# Patient Record
Sex: FEMALE | Race: AMERICAN INDIAN OR ALASKA NATIVE
[De-identification: names, ages, dates, MRNs, and addresses within clinical notes are randomized per-mention and may not be internally consistent; named-entity substitution may affect disease eponyms.]

---

## 2019-06-07 ENCOUNTER — HOSPITAL ENCOUNTER (EMERGENCY)
Dept: HOSPITAL 5 - ED | Age: 29
Discharge: HOME | End: 2019-06-07
Payer: MEDICAID

## 2019-06-07 VITALS — SYSTOLIC BLOOD PRESSURE: 122 MMHG | DIASTOLIC BLOOD PRESSURE: 70 MMHG

## 2019-06-07 DIAGNOSIS — G43.909: Primary | ICD-10-CM

## 2019-06-07 PROCEDURE — 96372 THER/PROPH/DIAG INJ SC/IM: CPT

## 2019-06-07 PROCEDURE — 99282 EMERGENCY DEPT VISIT SF MDM: CPT

## 2019-06-07 NOTE — EVENT NOTE
ED Screening Note


ED Screening Note: 





pt is c/o HA for three days 


has a hx of migraines, states she use to take propranol and amtriptyline states 

she stopped taking it a year ago because she went to snf 


+phophobia


+nausea


LNMP: 6/6/19











This initial assessment/diagnostic orders/clinical plan/treatment(s) is/are 

subject to change based on patients health status, clinical progression and re-

assessment by fellow clinical providers in the ED. Further treatment and workup 

at subsequent clinical providers discretion. Patient/guardian urged not to elope

from the ED as their condition may be serious if not clinically assessed and 

managed.

## 2019-06-07 NOTE — EMERGENCY DEPARTMENT REPORT
ED Headache HPI





- General


Chief Complaint: Headache


Stated Complaint: CHEST PAIN/MIGRAINE


Time Seen by Provider: 19 12:05


Source: patient





- History of Present Illness


Timing/Duration: 1 week


Quality: moderate


Head Injury Location: global


Recent Head Trauma: frequent headaches


Modifying Factors: improves with: other (she has a history of taking several 

medications which usually does make her sleepy.  Patient is been off of these 

medications for some time secondary to not having a primary care physician.)


Associated Symptoms: other (light sensitivity).  denies: confusion, fatigue, 

facial pain, fever/chills, loss of consciousness, nausea/vomiting, nasal 

congestion, nasal drainage, numbness in legs/feet, seizures, sinus infection, 

stiff neck


Allergies/Adverse Reactions: 


Allergies





No Known Allergies Allergy (Unverified 10/03/17 12:03)


   








Home Medications: 


Ambulatory Orders





Butalb/Acetamin/Caff -40 [Fioricet -40] 1 tab PO Q8HR PRN #10 tablet

19 


Ketorolac [Toradol] 10 mg PO Q6H PRN #12 tablet 19 











ED Review of Systems


ROS: 


Stated complaint: CHEST PAIN/MIGRAINE


Other details as noted in HPI





Comment: All other systems reviewed and negative





ED Past Medical Hx





- Past Medical History


Previous Medical History?: Yes


Hx Headaches / Migraines: Yes





- Surgical History


Past Surgical History?: Yes


Additional Surgical History: ,tubal





- Social History


Smoking Status: Never Smoker


Substance Use Type: None





- Medications


Home Medications: 


                                Home Medications











 Medication  Instructions  Recorded  Confirmed  Last Taken  Type


 


Butalb/Acetamin/Caff -40 1 tab PO Q8HR PRN #10 tablet 19  Unknown Rx





[Fioricet -40]     


 


Ketorolac [Toradol] 10 mg PO Q6H PRN #12 tablet 19  Unknown Rx














ED Physical Exam





- General


Limitations: No Limitations


General appearance: alert, in no apparent distress





- Head


Head exam: Present: atraumatic, normocephalic





- Eye


Eye exam: Present: normal appearance, PERRL, EOMI





- ENT


ENT exam: Present: mucous membranes moist





- Neck


Neck exam: Present: normal inspection





- Respiratory


Respiratory exam: Present: normal lung sounds bilaterally.  Absent: respiratory 

distress





- Cardiovascular


Cardiovascular Exam: Present: regular rate, normal rhythm.  Absent: systolic 

murmur, diastolic murmur, rubs, gallop





- GI/Abdominal


GI/Abdominal exam: Present: soft, normal bowel sounds





- Extremities Exam


Extremities exam: Present: normal inspection





- Back Exam


Back exam: Present: normal inspection





- Neurological Exam


Neurological exam: Present: alert, oriented X3





- Psychiatric


Psychiatric exam: Present: normal affect, normal mood





- Skin


Skin exam: Present: warm, dry, intact, normal color.  Absent: rash





ED Course





                                   Vital Signs











  19





  12:05


 


Temperature 97.9 F


 


Pulse Rate 71


 


Respiratory 16





Rate 


 


Blood Pressure 140/85





[Left] 


 


O2 Sat by Pulse 100





Oximetry 














ED Medical Decision Making





- Medical Decision Making





Patient given a shot of Toradol for pain control the patient be discharged home.


Critical care attestation.: 


If time is entered above; I have spent that time in minutes in the direct care 

of this critically ill patient, excluding procedure time.








ED Disposition


Clinical Impression: 


Migraine


Qualifiers:


 Migraine type: unspecified Status migrainosus presence: without status 

migrainosus Intractability: not intractable Qualified Code(s): G43.909 - 

Migraine, unspecified, not intractable, without status migrainosus





Disposition: DC-01 TO HOME OR SELFCARE


Is pt being admited?: No


Does the pt Need Aspirin: No


Condition: Stable


Instructions:  Migraine Headache (ED)


Referrals: 


[facility], MD [Primary Care Provider] - 3-5 Days


Time of Disposition: 13:53

## 2019-08-06 ENCOUNTER — HOSPITAL ENCOUNTER (EMERGENCY)
Dept: HOSPITAL 5 - ED | Age: 29
Discharge: HOME | End: 2019-08-06
Payer: MEDICAID

## 2019-08-06 VITALS — DIASTOLIC BLOOD PRESSURE: 52 MMHG | SYSTOLIC BLOOD PRESSURE: 117 MMHG

## 2019-08-06 DIAGNOSIS — G43.909: Primary | ICD-10-CM

## 2019-08-06 DIAGNOSIS — Z79.899: ICD-10-CM

## 2019-08-06 PROCEDURE — 96375 TX/PRO/DX INJ NEW DRUG ADDON: CPT

## 2019-08-06 PROCEDURE — 96374 THER/PROPH/DIAG INJ IV PUSH: CPT

## 2019-08-06 PROCEDURE — 99282 EMERGENCY DEPT VISIT SF MDM: CPT

## 2019-08-06 NOTE — EVENT NOTE
ED Screening Note


Date of service: 08/06/19


Time: 16:20


ED Screening Note: 


28 y/o female comes in for headache times 1.5 weeks and 3 days of hand and feet 

swelling.  History of migraines








This initial assessment/diagnostic orders/clinical plan/treatment(s) is/are 

subject to change based on patients health status, clinical progression and re-

assessment by fellow clinical providers in the ED. Further treatment and workup 

at subsequent clinical providers discretion. Patient/guardian urged not to elope

from the ED as their condition may be serious if not clinically assessed and 

managed. 





Initial orders include:

## 2019-08-06 NOTE — EMERGENCY DEPARTMENT REPORT
HPI





- General


Chief Complaint: Headache


Time Seen by Provider: 19 16:09





ED Past Medical Hx





- Past Medical History


Hx Headaches / Migraines: Yes





- Surgical History


Past Surgical History?: Yes


Additional Surgical History: ,tubal





- Family History


Family history: no significant





- Social History


Smoking Status: Never Smoker


Substance Use Type: None





- Medications


Home Medications: 


                                Home Medications











 Medication  Instructions  Recorded  Confirmed  Last Taken  Type


 


Butalb/Acetamin/Caff -40 1 tab PO Q8HR PRN #10 tablet 19  Unknown Rx





[Fioricet -40]     


 


Ketorolac [Toradol] 10 mg PO Q6H PRN #12 tablet 19  Unknown Rx














ED Review of Systems


ROS: 


Stated complaint: BI HAND/FEET SWELLING/HEADACHE


Other details as noted in HPI





Comment: All other systems reviewed and negative





Physical Exam





- Physical Exam


Vital Signs: 


                                   Vital Signs











  19





  15:54 17:01


 


Temperature 98.3 F 


 


Pulse Rate 83 


 


Respiratory 18 16





Rate  


 


Blood Pressure 117/52 


 


O2 Sat by Pulse 99 





Oximetry  











Physical Exam: 





ALERT AND ORIENTED


NO FOCAL DEFICIT


AMBULATORY


NO VOMITING


SEES HER NEURO MD IN ONE WEEK


S1S2


LUNGS CTA


ABD SNT








ED Course


                                   Vital Signs











  19





  15:54 17:01


 


Temperature 98.3 F 


 


Pulse Rate 83 


 


Respiratory 18 16





Rate  


 


Blood Pressure 117/52 


 


O2 Sat by Pulse 99 





Oximetry  














ED Medical Decision Making





- Medical Decision Making





PT HAS HX OF MIGRAIENE


THIS IS HER USUAL HEADACHE SHE JUST CAN NOT BREAK IT WITH HER MEDS


NO HEAD TRAUMA


POS NAUSEA





NEURO INTACT


VSS





HAS APPNT NEXT WED WITH HER MD





NS/TORADOL/ZOFRAN/FIORICET IN ER





DC HOME WITH NEURO FOLLOW UP





                                   Vital Signs











  19





  15:54 17:01


 


Temperature 98.3 F 


 


Pulse Rate 83 


 


Respiratory 18 16





Rate  


 


Blood Pressure 117/52 


 


O2 Sat by Pulse 99 





Oximetry  














- Differential Diagnosis


A/C MIGRAINE


Critical care attestation.: 


If time is entered above; I have spent that time in minutes in the direct care 

of this critically ill patient, excluding procedure time.








ED Disposition


Clinical Impression: 


 Migraine





Disposition: DC-01 TO HOME OR SELFCARE


Is pt being admited?: No


Does the pt Need Aspirin: No


Condition: Stable


Instructions:  Migraine Headache (ED)


Additional Instructions: 


HYDRATE WELL





MEDS PER YOUR ROUTINE





FOLLOW UP WITH YOUR MD ASAP








Referrals: 


CAROLINA WASSERMAN MD [Referring] - 3-5 Days


Time of Disposition: 17:17

## 2021-09-02 ENCOUNTER — HOSPITAL ENCOUNTER (OUTPATIENT)
Dept: HOSPITAL 5 - ED | Age: 31
Setting detail: OBSERVATION
LOS: 1 days | Discharge: HOME | End: 2021-09-03
Attending: INTERNAL MEDICINE | Admitting: INTERNAL MEDICINE
Payer: MEDICAID

## 2021-09-02 DIAGNOSIS — Z90.710: ICD-10-CM

## 2021-09-02 DIAGNOSIS — K59.00: ICD-10-CM

## 2021-09-02 DIAGNOSIS — Z98.51: ICD-10-CM

## 2021-09-02 DIAGNOSIS — R06.00: ICD-10-CM

## 2021-09-02 DIAGNOSIS — R42: ICD-10-CM

## 2021-09-02 DIAGNOSIS — G43.909: ICD-10-CM

## 2021-09-02 DIAGNOSIS — D50.9: Primary | ICD-10-CM

## 2021-09-02 LAB
BASOPHILS # (AUTO): 0 K/MM3 (ref 0–0.1)
BASOPHILS NFR BLD AUTO: 0.3 % (ref 0–1.8)
BUN SERPL-MCNC: 7 MG/DL (ref 7–17)
BUN/CREAT SERPL: 12 %
CALCIUM SERPL-MCNC: 9.3 MG/DL (ref 8.4–10.2)
EOSINOPHIL # BLD AUTO: 0.1 K/MM3 (ref 0–0.4)
EOSINOPHIL NFR BLD AUTO: 1.1 % (ref 0–4.3)
HCT VFR BLD CALC: 21.1 % (ref 30.3–42.9)
HEMOLYSIS INDEX: 1
HGB BLD-MCNC: 5.9 GM/DL (ref 10.1–14.3)
LYMPHOCYTES # BLD AUTO: 2.2 K/MM3 (ref 1.2–5.4)
LYMPHOCYTES NFR BLD AUTO: 44.8 % (ref 13.4–35)
MCHC RBC AUTO-ENTMCNC: 28 % (ref 30–34)
MCV RBC AUTO: 64 FL (ref 79–97)
MONOCYTES # (AUTO): 0.3 K/MM3 (ref 0–0.8)
MONOCYTES % (AUTO): 5.7 % (ref 0–7.3)
PLATELET # BLD: 229 K/MM3 (ref 140–440)
RBC # BLD AUTO: 3.32 M/MM3 (ref 3.65–5.03)

## 2021-09-02 PROCEDURE — 86900 BLOOD TYPING SEROLOGIC ABO: CPT

## 2021-09-02 PROCEDURE — 85018 HEMOGLOBIN: CPT

## 2021-09-02 PROCEDURE — P9016 RBC LEUKOCYTES REDUCED: HCPCS

## 2021-09-02 PROCEDURE — 85610 PROTHROMBIN TIME: CPT

## 2021-09-02 PROCEDURE — 36430 TRANSFUSION BLD/BLD COMPNT: CPT

## 2021-09-02 PROCEDURE — 86920 COMPATIBILITY TEST SPIN: CPT

## 2021-09-02 PROCEDURE — 86901 BLOOD TYPING SEROLOGIC RH(D): CPT

## 2021-09-02 PROCEDURE — 84703 CHORIONIC GONADOTROPIN ASSAY: CPT

## 2021-09-02 PROCEDURE — 80048 BASIC METABOLIC PNL TOTAL CA: CPT

## 2021-09-02 PROCEDURE — C9113 INJ PANTOPRAZOLE SODIUM, VIA: HCPCS

## 2021-09-02 PROCEDURE — 36415 COLL VENOUS BLD VENIPUNCTURE: CPT

## 2021-09-02 PROCEDURE — 96374 THER/PROPH/DIAG INJ IV PUSH: CPT

## 2021-09-02 PROCEDURE — 85014 HEMATOCRIT: CPT

## 2021-09-02 PROCEDURE — 85025 COMPLETE CBC W/AUTO DIFF WBC: CPT

## 2021-09-02 PROCEDURE — 85379 FIBRIN DEGRADATION QUANT: CPT

## 2021-09-02 PROCEDURE — 86850 RBC ANTIBODY SCREEN: CPT

## 2021-09-02 PROCEDURE — 85007 BL SMEAR W/DIFF WBC COUNT: CPT

## 2021-09-02 PROCEDURE — G0378 HOSPITAL OBSERVATION PER HR: HCPCS

## 2021-09-02 PROCEDURE — 99291 CRITICAL CARE FIRST HOUR: CPT

## 2021-09-02 PROCEDURE — 96361 HYDRATE IV INFUSION ADD-ON: CPT

## 2021-09-02 NOTE — EMERGENCY DEPARTMENT REPORT
ED Recheck HPI





- General


Chief Complaint: Recheck/Abnormal Lab/Rx


Stated Complaint: LOW HEMOGLOBIN/WEAKNESS/HEADACHE/CP/


Time Seen by Provider: 21 23:01


Source: patient


Mode of arrival: Ambulatory


Limitations: No Limitations





- History of Present Illness


Initial Comments: 





Patient is a 31-year-old female who presents emergency room with complaints of 

lightheadedness, weakness and dyspnea on exertion.  Patient states her symptoms 

been well for 2 weeks.  Patient states her symptoms are worsening.  Patient 

denies chest pain.  Patient states that because of the symptoms she went to her 

primary care yesterday and had some blood drawn.  Patient states that her 

primary care called her this evening told to come to the hospital because her 

H&H was low.  Patient states hemoglobin was 5.7.  Patient states she had a 

transfusion in the past.  Patient denies fall.  Patient denied patient.  Patient

denies headache.








Patient denies recent travel.  Patient denies recent international travel.  

Patient denies exposure to the novel coronavirus.  Patient denies sick contacts.

 Patient denies fever and chills.  Patient denies cough.  Patient denies diarr

hea.  Patient denies coming in contact with anybody with symptoms of the novel 

coronavirus.








MD Complaint: abnormal lab


-: Sudden


Returns Today for: CBOAL


Context: called for abnorm lab res


Associated Symptoms: shortness of breath, other





- Related Data


                                  Previous Rx's











 Medication  Instructions  Recorded  Last Taken  Type


 


Butalb/Acetamin/Caff -40 1 tab PO Q8HR PRN #10 tablet 19 Unknown Rx





[Fioricet -40]    


 


Ketorolac [Toradol] 10 mg PO Q6H PRN #12 tablet 19 Unknown Rx


 


Cyclobenzaprine [Flexeril] 10 mg PO TID PRN #30 tablet 19 Unknown Rx


 


Menthol/Camphor [Tiger Balm 1 applicatio TP QID PRN #1 tube 19 Unknown Rx





Ointment]    


 


traMADoL [Ultram] 50 mg PO Q6HR PRN #12 tablet 19 Unknown Rx











                                    Allergies











Allergy/AdvReac Type Severity Reaction Status Date / Time


 


No Known Allergies Allergy   Verified 19 18:04














ED Review of Systems


ROS: 


Stated complaint: LOW HEMOGLOBIN/WEAKNESS/HEADACHE/CP/


Other details as noted in HPI





Constitutional: weakness.  denies: chills, fever


Eyes: denies: eye pain, eye discharge, vision change


ENT: denies: ear pain, throat pain


Respiratory: SOB with exertion.  denies: cough, wheezing


Cardiovascular: dyspnea on exertion.  denies: chest pain, palpitations


Endocrine: no symptoms reported


Gastrointestinal: denies: abdominal pain, nausea, diarrhea


Genitourinary: denies: urgency, dysuria, discharge


Musculoskeletal: denies: back pain, joint swelling, arthralgia


Skin: denies: rash, lesions


Neurological: as per HPI, weakness.  denies: headache, paresthesias


Psychiatric: denies: anxiety, depression


Hematological/Lymphatic: denies: easy bleeding, easy bruising





ED Past Medical Hx





- Past Medical History


Previous Medical History?: Yes


Hx Headaches / Migraines: Yes


Additional medical history: ANEMIA.  BLOOD TRANSFUSION





- Surgical History


Past Surgical History?: Yes


Additional Surgical History: ,tubal





- Family History


Family history: no significant





- Social History


Smoking Status: Never Smoker


Substance Use Type: None





- Medications


Home Medications: 


                                Home Medications











 Medication  Instructions  Recorded  Confirmed  Last Taken  Type


 


Butalb/Acetamin/Caff -40 1 tab PO Q8HR PRN #10 tablet 19  Unknown Rx





[Fioricet -40]     


 


Ketorolac [Toradol] 10 mg PO Q6H PRN #12 tablet 19  Unknown Rx


 


Cyclobenzaprine [Flexeril] 10 mg PO TID PRN #30 tablet 19  Unknown Rx


 


Menthol/Camphor [Tiger Balm 1 applicatio TP QID PRN #1 tube 19  Unknown Rx





Ointment]     


 


traMADoL [Ultram] 50 mg PO Q6HR PRN #12 tablet 19  Unknown Rx














ED Physical Exam





- General


Limitations: No Limitations


General appearance: alert, in no apparent distress





- Head


Head exam: Present: atraumatic, normocephalic





- Eye


Eye exam: Present: normal appearance, PERRL, other (Scleral pallor noted.)


Pupils: Present: normal accommodation





- ENT


ENT exam: Present: mucous membranes moist





- Neck


Neck exam: Present: normal inspection





- Respiratory


Respiratory exam: Present: normal lung sounds bilaterally.  Absent: respiratory 

distress, wheezes, rales





- Cardiovascular


Cardiovascular Exam: Present: regular rate, normal rhythm.  Absent: systolic 

murmur, diastolic murmur, rubs, gallop





- GI/Abdominal


GI/Abdominal exam: Present: soft, normal bowel sounds





- Extremities Exam


Extremities exam: Present: normal inspection





- Back Exam


Back exam: Present: normal inspection





- Neurological Exam


Neurological exam: Present: alert, oriented X3





- Psychiatric


Psychiatric exam: Present: normal affect, normal mood





- Skin


Skin exam: Present: warm, dry, intact, pallor.  Absent: rash





ED Course





                                   Vital Signs











  21





  21:26


 


Temperature 98.4 F


 


Pulse Rate 94 H


 


Respiratory 20





Rate 


 


Blood Pressure 131/82


 


O2 Sat by Pulse 100





Oximetry 














- Reevaluation(s)


Reevaluation #1: 


I discussed all results with patient.  I discussed plan of care with patient.  

Patient agrees with plan of care and admission.  Patient to be admitted to the 

hospitalist service.


21 23:32








- Consultations


Consultation #1: 


Hospitalist consulted for admission.  Hospitalist to admit patient.


21 23:29








ED Recheck MDM





- Core Measures


AMI Core Measures Followed: Yes





- Differential Diagnosis


Anemia, shortness of breath, HANNA, weakness, lightheadedness





- Medical Decision Making





Patient is a 31-year-old female who presents emergency room with complaints of 

headedness, dyspnea on exertion and weakness.  Patient states that she went to 

her primary care yesterday and had some blood showed that she was anemic with a 

hemoglobin of 5.7.  Patient states she has a history of anemia and she has been 

recently started on iron.  Patient states that her symptoms been going on for 2 

weeks.  Patient had labs done which were essentially unremarkable except for 

severe anemia.  Patient was typed and screened.  Patient be transfused 1 unit in

ER.  Patient admitted to the hospital service for further evaluation and 

treatment.





Critical care time documented due to the multiple reassessments, prolonged time 

at the bedside, interpretation of diagnostics and labs.








Critical Care Time: Yes


Critical care time in (mins) excluding proc time.: 35


Critical care attestation.: 


If time is entered above; I have spent that time in minutes in the direct care 

of this critically ill patient, excluding procedure time.





Critical Care Time: 





35 minutes











ED Disposition


Clinical Impression: 


 HANNA (dyspnea on exertion), Lightheadedness, Weakness





Anemia


Qualifiers:


 Anemia type: unspecified type Qualified Code(s): D64.9 - Anemia, unspecified





Disposition: 09 ADMITTED AS INPATIENT


Is pt being admited?: Yes


Does the pt Need Aspirin: No


Condition: Critical


Time of Disposition: 23:35

## 2021-09-02 NOTE — HISTORY AND PHYSICAL REPORT
History of Present Illness


Date of examination: 21


Date of admission: 


2021


Chief complaint: 





Shortness of Breath


Weakness


History of present illness: 


31-year-old female with no significant past medical history Presents to the 

emergency room today complaining of generalized weakness and dyspnea on 

exertion.  This has been ongoing for the past 2 weeks.


Patient denies any cough, no fever or chills, no nausea vomiting, no abdominal 

pain, no diarrhea.  Patient denies heavy menstruation, denies any hematemesis.  

She however indicates that she takes ibuprofen occasionally for headache and she

saw streaks of blood on her stool few days ago.





She had gone to see her primary care physician few days ago and had some labs 

done.  She was later called yesterday to report to the emergency room because of

her low blood count.


Patient states that she has had history of anemia in the past and has had blood 

transfusion in the past.  She has not had any major work-up for the anemia.





Work-up in the emergency room today, hemoglobin was 5.9 and hematocrit was 21.1.





Patient has been scheduled for blood transfusion.





Past History


Past Medical History: anemia, migraines


Past Surgical History: , Other (Tubal ligation)


Social history: denies: no significant social history


Family history: denies: no significant family history





Medications and Allergies


                                    Allergies











Allergy/AdvReac Type Severity Reaction Status Date / Time


 


No Known Allergies Allergy   Verified 19 18:04











                                Home Medications











 Medication  Instructions  Recorded  Confirmed  Last Taken  Type


 


Butalb/Acetamin/Caff -40 1 tab PO Q8HR PRN #10 tablet 19  Unknown Rx





[Fioricet -40]     


 


Cyclobenzaprine [Flexeril 10 MG 10 mg PO TID PRN #30 tablet 19  Unknown Rx





TAB]     


 


Menthol/Camphor [Tiger Balm 1 applicatio TP QID PRN #1 tube 19  Unknown Rx





Ointment]     


 


traMADoL [Ultram 50 MG tab] 50 mg PO Q6HR PRN #12 tablet 19  Unknown Rx


 


Ascorbic Acid [Vitamin C] 1,000 mg PO DAILY #30 tablet 21  Unknown Rx


 


Ferrous Sulfate [Feosol 325 MG tab] 325 mg PO BID #60 tablet 21  Unknown 

Rx


 


Sennosides/Docusate Sodium [Senna 1 each PO DAILY #30 tablet 21  Unknown 

Rx





Plus Tablet]     














Review of Systems


Constitutional: weakness, no fever, no chills


Ears, nose, mouth and throat: no nasal congestion, no sore throat


Cardiovascular: no chest pain, no palpitations


Respiratory: shortness of breath, no cough


Gastrointestinal: hematochezia, no abdominal pain, no nausea, no vomiting, no 

diarrhea


Genitourinary Female: no pelvic pain, no flank pain, no dysuria, no hematuria


Menstruation: no period heavy


Musculoskeletal: no neck pain, no low back pain


Integumentary: no rash, no pruritis


Neurological: no headaches, no confusion


Psychiatric: no anxiety, no depression


Endocrine: no polyphagia, no polydipsia, no polyuria, no nocturia





Exam





- Constitutional


Vitals: 


                                        











Temp Pulse Resp BP Pulse Ox


 


 98.4 F   94 H  20   131/82   100 


 


 21 21:26  21 21:26  21 21:26  21 21:26  21 21:26











General appearance: Present: no acute distress, well-nourished, other (Moderate 

Pallor)





- EENT


Eyes: Present: PERRL, EOM intact.  Absent: scleral icterus


ENT: hearing intact, clear oral mucosa, dentition normal





- Neck


Neck: Present: supple, normal ROM





- Respiratory


Respiratory effort: normal


Respiratory: bilateral: CTA





- Cardiovascular


Rhythm: regular


Heart Sounds: Present: S1 & S2.  Absent: gallop, systolic murmur, diastolic 

murmur, rub, click





- Extremities


Extremities: no ischemia, pulses intact, pulses symmetrical, No edema, normal 

temperature, normal color, Full ROM


Peripheral Pulses: within normal limits





- Abdominal


General gastrointestinal: Present: soft, non-tender, non-distended, normal bowel

sounds.  Absent: mass





- Integumentary


Integumentary: Present: clear, warm, dry





- Musculoskeletal


Musculoskeletal: strength equal bilaterally





- Psychiatric


Psychiatric: appropriate mood/affect, intact judgment & insight, memory intact, 

cooperative





- Neurologic


Neurologic: CNII-XII intact, no focal deficits, moves all extremities





Results





- Labs


CBC & Chem 7: 


                                 21 14:03





                                 21 04:02


Labs: 


                              Abnormal lab results











  21 Range/Units





  21:53 21:53 21:53 


 


RBC  3.32 L    (3.65-5.03)  M/mm3


 


Hgb  5.9 L*    (10.1-14.3)  gm/dl


 


Hct  21.1 L    (30.3-42.9)  %


 


MCV  64 L    (79-97)  fl


 


MCH  18 L    (28-32)  pg


 


MCHC  28 L    (30-34)  %


 


RDW  17.9 H    (13.2-15.2)  %


 


Lymph % (Auto)  44.8 H    (13.4-35.0)  %


 


Sodium   136 L   (137-145)  mmol/L


 


Glucose   106 H   ()  mg/dL


 


Crossmatch    See Detail  














Assessment and Plan





- Patient Problems


(1) Anemia


Status: Acute   


Qualifiers: 


   Anemia type: unspecified type   Qualified Code(s): D64.9 - Anemia, 

unspecified   


Plan to address problem: 


Etiology is unclear.  Probably secondary to GI loss.


Patient has been scheduled for blood transfusion.


We will place consult to gastroenterology for evaluation.


We will also check stool Hemoccult.








(2) HANNA (dyspnea on exertion)


Status: Acute   


Plan to address problem: 


Possibly secondary to symptomatic anemia.


Patient currently having blood transfusion.








(3) DVT prophylaxis


Status: Acute   


Plan to address problem: 


We will place patient on sequential compression device.








(4) Full code status


Status: Acute   


Plan to address problem: 


Patient is full code.

## 2021-09-03 ENCOUNTER — OUT OF OFFICE VISIT (OUTPATIENT)
Dept: URBAN - METROPOLITAN AREA MEDICAL CENTER 41 | Facility: MEDICAL CENTER | Age: 31
End: 2021-09-03
Payer: COMMERCIAL

## 2021-09-03 VITALS — DIASTOLIC BLOOD PRESSURE: 73 MMHG | SYSTOLIC BLOOD PRESSURE: 125 MMHG

## 2021-09-03 DIAGNOSIS — K59.09 CHANGE IN BOWEL MOVEMENTS INTERMITTENT CONSTIPATION. URGENCY IN THE MORNING.: ICD-10-CM

## 2021-09-03 DIAGNOSIS — D50.9 ANEMIA: ICD-10-CM

## 2021-09-03 LAB
%HYPO/RBC NFR BLD AUTO: (no result) %
ANISOCYTOSIS BLD QL SMEAR: (no result)
BAND NEUTROPHILS # (MANUAL): 0 K/MM3
BUN SERPL-MCNC: 7 MG/DL (ref 7–17)
BUN/CREAT SERPL: 12 %
BURR CELLS BLD QL SMEAR: (no result)
CALCIUM SERPL-MCNC: 8.5 MG/DL (ref 8.4–10.2)
HCT VFR BLD CALC: 23.2 % (ref 30.3–42.9)
HCT VFR BLD CALC: 29.1 % (ref 30.3–42.9)
HEMOLYSIS INDEX: 2
HGB BLD-MCNC: 6.8 GM/DL (ref 10.1–14.3)
HGB BLD-MCNC: 8.7 GM/DL (ref 10.1–14.3)
INR PPP: 0.92 (ref 0.87–1.13)
MCHC RBC AUTO-ENTMCNC: 29 % (ref 30–34)
MCV RBC AUTO: 67 FL (ref 79–97)
MYELOCYTES # (MANUAL): 0 K/MM3
OVALOCYTES BLD QL SMEAR: (no result)
PLATELET # BLD: 199 K/MM3 (ref 140–440)
POIKILOCYTOSIS BLD QL SMEAR: (no result)
PROMYELOCYTES # (MANUAL): 0 K/MM3
RBC # BLD AUTO: 3.46 M/MM3 (ref 3.65–5.03)
TOTAL CELLS COUNTED BLD: 100

## 2021-09-03 PROCEDURE — 99204 OFFICE O/P NEW MOD 45 MIN: CPT | Performed by: INTERNAL MEDICINE

## 2021-09-03 NOTE — CONSULTATION
History of Present Illness





- Reason for Consult


Consult date: 21


Anemia


Requesting physician: REI LOJA





- History of Present Illness


Ms. Mena is a 31-year-old  who has a 2-week history of 

lightheadedness dizziness and weakness.  She was seen by her primary care 

physician and found to have a hemoglobin of 5.7 and advised to go to the 

emergency room for evaluation.  She has had a transfusion in the emergency room.

 Currently, she feels well.


Of note, patient states that she has a history of iron deficiency anemia since 

.  Her sister also has the same thing.  Iron deficiency has been documented.

 Patient has been transfused once several years ago.  She states that she has 

not been given any etiology for her iron deficiency anemia.


Her bowel movements occur once a week to every 2 weeks and this is a lifelong 

problem.  She does have to strain at times.  However, she has had no 

hematochezia or melena.  She denies any abdominal pain or nausea.  There is been

no hematemesis.  There is no weight loss.  She has regular menstrual cycles that

she states are normal and not necessarily heavy.  She has not undergone any 

endoscopic evaluation for her anemia, and any further work-up is unknown.  She 

denies any family history of sickle cell disease or known hemoglobinopathy.


Medications reviewed.








Past History


Past Medical History: anemia (iron deficiency, since ), migraines


Past Surgical History: , Other (Tubal ligation)


Social history: denies: no significant social history, smoking, alcohol abuse


Family history: denies: no significant family history





Medications and Allergies


                                    Allergies











Allergy/AdvReac Type Severity Reaction Status Date / Time


 


No Known Allergies Allergy   Verified 19 18:04











                                Home Medications











 Medication  Instructions  Recorded  Confirmed  Last Taken  Type


 


Butalb/Acetamin/Caff -40 1 tab PO Q8HR PRN #10 tablet 19  Unknown Rx





[Fioricet -40]     


 


Cyclobenzaprine [Flexeril 10 MG 10 mg PO TID PRN #30 tablet 19  Unknown Rx





TAB]     


 


Menthol/Camphor [Tiger Balm 1 applicatio TP QID PRN #1 tube 19  Unknown Rx





Ointment]     


 


traMADoL [Ultram 50 MG tab] 50 mg PO Q6HR PRN #12 tablet 19  Unknown Rx


 


Ferrous Sulfate [Feosol 325 MG tab] 325 mg PO BID #60 tablet 21  Unknown 

Rx


 


Sennosides/Docusate Sodium [Senna 1 each PO DAILY #30 tablet 21  Unknown 

Rx





Plus Tablet]     











Active Meds: 


Active Medications





Acetaminophen (Acetaminophen 325 Mg Tab)  650 mg PO Q4H PRN


   PRN Reason: Pain MILD(1-3)/Fever >100.5/HA


Magnesium Hydroxide (Magnesium Hydroxide (Mom) Oral Liqd Udc)  30 ml PO Q4H PRN


   PRN Reason: Constipation


Morphine Sulfate (Morphine 2 Mg/1 Ml Inj)  2 mg IV Q4H PRN


   PRN Reason: Pain, Moderate (4-6)


Morphine Sulfate (Morphine 4 Mg/1 Ml Inj)  4 mg IV Q4H PRN


   PRN Reason: Pain , Severe (7-10)


Ondansetron HCl (Ondansetron 4 Mg/2 Ml Inj)  4 mg IV Q8H PRN


   PRN Reason: Nausea And Vomiting


Pantoprazole Sodium (Pantoprazole 40 Mg Inj)  40 mg IV BID [facility]


   Last Admin: 21 10:19 Dose:  40 mg


   Documented by: 


Sodium Chloride (Sodium Chloride 0.9% 10 Ml Flush Syringe)  10 ml IV BID [facility]


   Last Admin: 21 10:19 Dose:  10 ml


   Documented by: 


Sodium Chloride (Sodium Chloride 0.9% 10 Ml Flush Syringe)  10 ml IV PRN PRN


   PRN Reason: LINE FLUSH











Review of Systems


All systems: negative (as per HPI)





Exam





- Constitutional


Vitals: 


                                        











Temp Pulse Resp BP Pulse Ox


 


 98.2 F   78   16   134/68   100 


 


 21 07:50  21 07:50  21 07:50  21 07:50  21 07:50











General appearance: Present: no acute distress





- EENT


Eyes: Present: PERRL, EOM intact


ENT: hearing intact





- Respiratory


Respiratory effort: normal


Respiratory: bilateral: CTA





- Cardiovascular


Rhythm: regular


Heart Sounds: Present: S1 & S2





- Extremities


Extremities: No edema





- Abdominal


General gastrointestinal: Present: soft, non-tender





- Rectal


Rectal Exam: deferred





Results





- Labs


CBC & Chem 7: 


                                 21 04:02





                                 21 04:02


Labs: 


                              Abnormal lab results











  21 Range/Units





  21:53 21:53 21:53 


 


RBC  3.32 L    (3.65-5.03)  M/mm3


 


Hgb  5.9 L*    (10.1-14.3)  gm/dl


 


Hct  21.1 L    (30.3-42.9)  %


 


MCV  64 L    (79-97)  fl


 


MCH  18 L    (28-32)  pg


 


MCHC  28 L    (30-34)  %


 


RDW  17.9 H    (13.2-15.2)  %


 


Lymph % (Auto)  44.8 H    (13.4-35.0)  %


 


Lymphocytes % (Manual)     (13.4-35.0)  %


 


Sodium   136 L   (137-145)  mmol/L


 


Glucose   106 H   ()  mg/dL


 


Crossmatch    See Detail  














  21 Range/Units





  04:02 04:02 


 


RBC  3.46 L   (3.65-5.03)  M/mm3


 


Hgb  6.8 L   (10.1-14.3)  gm/dl


 


Hct  23.2 L   (30.3-42.9)  %


 


MCV  67 L   (79-97)  fl


 


MCH  20 L   (28-32)  pg


 


MCHC  29 L   (30-34)  %


 


RDW  20.3 H   (13.2-15.2)  %


 


Lymph % (Auto)    (13.4-35.0)  %


 


Lymphocytes % (Manual)  41.0 H   (13.4-35.0)  %


 


Sodium    (137-145)  mmol/L


 


Glucose   110 H  ()  mg/dL


 


Crossmatch    














Assessment and Plan


1.  Iron deficiency anemiaetiology unclear.  This is longstanding.  This is 

either due to loss of iron from blood loss from the GI tract or gynecological 

losses.  Alternatively, she may be mall absorbing and have low affinity iron 

transporter proteins precluding her from being adequately able to absorb her 

iron.


-After stabilization with transfusion, may discharge to home from GI standpoint.


-Would discharge on twice a day iron along with vitamin C.


-As an outpatient, would do GI work-up with stool for occult blood as well as 

colonoscopy and endoscopy.


-Would also have patient check with GYN to ensure no etiology for iron 

deficiency anemia there.


-Then, monitor blood count and iron levels and if fails to improve, patient may 

need to be put on a regimen of regular IV iron infusions.





2.  Constipationconsistent with colonic inertia based on history.


-Would discharge patient on daily MiraLAX or even twice a day.


-Patient may take milk of magnesia as needed for now, like she has been at home.


-If MiraLAX does not work, patient may benefit from Linzess or other promotility

agent


-Further evaluation as outpatient.





No further GI recommendations.  We will sign off.  Thanks.

## 2021-09-03 NOTE — DISCHARGE SUMMARY
Providers





- Providers


Date of Admission: 


09/02/21 23:19





Attending physician: 


TIN LIMA MD





                                        





09/03/21 05:17


Consult to Physician [CONS] Routine 


   Comment: 


   Consulting Provider: MELIA MORIN


   Physician Instructions: 


   Reason For Exam: anemia, blood stool














Hospitalization


Reason for admission: shortness of breath


Condition: Stable


Hospital course: 


31-year-old female with no significant past medical history Presents to the 

emergency room today complaining of generalized weakness and dyspnea on 

exertion.  This has been ongoing for the past 2 weeks.


Patient denies any cough, no fever or chills, no nausea vomiting, no abdominal 

pain, no diarrhea.  Patient denies heavy menstruation, denies any hematemesis.  

She however indicates that she takes ibuprofen occasionally for headache as she 

is also streaks of blood on her stool few days ago.





She had gone to see her primary care physician few days ago and had some labs 

done.  She was later called yesterday to report to the emergency room because of

 her low blood count.


Patient states that she has had history of anemia in the past and has had blood 

transfusion in the past.  She has not had any major work-up for the anemia.





Work-up in the emergency room today, hemoglobin was 5.9 and hematocrit was 21.1.





Patient has been scheduled for blood transfusion.





Per GI evaluation


1.  Iron deficiency anemiaetiology unclear.  This is longstanding.  This is 

either due to loss of iron from blood loss from the GI tract or gynecological 

losses.  Alternatively, she may be mall absorbing and have low affinity iron t

ransporter proteins precluding her from being adequately able to absorb her 

iron.


-After stabilization with transfusion, may discharge to home from GI standpoint.


-Would discharge on twice a day iron along with vitamin C.


-As an outpatient, would do GI work-up with stool for occult blood as well as 

colonoscopy and endoscopy.


-Would also have patient check with GYN to ensure no etiology for iron de

ficiency anemia there.


-Then, monitor blood count and iron levels and if fails to improve, patient may 

need to be put on a regimen of regular IV iron infusions.





2.  Constipationconsistent with colonic inertia based on history.


-Would discharge patient on daily MiraLAX or even twice a day.


-Patient may take milk of magnesia as needed for now, like she has been at home.


-If MiraLAX does not work, patient may benefit from Linzess or other promotility

 agent


-Further evaluation as outpatient.














(1) Anemia- Iron Deficiency


Current Visit: Yes   Status: Acute   


Qualifiers: 


   Anemia type: unspecified type   Qualified Code(s): D64.9 - Anemia, 

unspecified   


Plan to address problem: 


Etiology is unclear.  Probably secondary to GI loss.


Patient has been scheduled for blood transfusion.


We will place consult to gastroenterology for evaluation.


We will also check stool Hemoccult.








(2) HANNA (dyspnea on exertion)


Current Visit: Yes   Status: Acute   


Plan to address problem: 


Possibly secondary to symptomatic anemia.


Patient currently having blood transfusion.








(3) DVT prophylaxis


Current Visit: Yes   Status: Acute   


Plan to address problem: 


We will place patient on sequential compression device.








(4) Full code status


Current Visit: Yes   Status: Acute   


Plan to address problem: 


Patient is full code.








Disposition: 01 HOME / SELF CARE / HOMELESS


Final Discharge Diagnosis (Prints w/discharge instructions): Iron deficiency 

anemia with Exertional dyspnea


Time spent for discharge: 35 mins





Core Measure Documentation





- Palliative Care


Palliative Care/ Comfort Measures: Not Applicable





- Core Measures


Any of the following diagnoses?: none





Exam





- Physical Exam


Narrative exam: 


VITAL SIGNS:  Reviewed.    


GENERAL:  The patient appears normally developed, Vital signs as documented.


HEAD:  No signs of head trauma.


EYES:  Pupils are equal.  Extraocular motions intact.  


EARS:  Hearing grossly intact.


MOUTH:  Oropharynx is normal. 


NECK:  No adenopathy, no JVD.  


CHEST:  Chest with clear breath sounds bilaterally.  No wheezes, rales, or 

rhonchi.  


CARDIAC:  Regular rate and rhythm.  S1 and S2, without murmurs, gallops, or 

rubs.


VASCULAR:  No Edema.  Peripheral pulses normal and equal in all extremities.


ABDOMEN:  Soft, non tender and non distended.  No   rebound or guarding, and no 

masses palpated.   Bowel Sounds normal.


MUSCULOSKELETAL:  Good range of motion of all major joints. Extremities without 

clubbing, cyanosis or edema.  


NEUROLOGIC EXAM:  Alert and oriented x 3   No focal sensory or strength 

deficits.   Speech normal.  Follows commands.


PSYCHIATRIC:  Mood normal.


SKIN:  detail exam as documented in skin assessment








- Constitutional


Vitals: 


                                        











Temp Pulse Resp BP Pulse Ox


 


 98.2 F   78   16   134/68   100 


 


 09/03/21 07:50  09/03/21 07:50  09/03/21 07:50  09/03/21 07:50  09/03/21 07:50














Plan


Activity: advance as tolerated, fall precautions


Diet: low fat


Special Instructions: record daily weights, record daily BP diary


Plan of Treatment: 


Follow with  your GYN





avoid NSAIDS


Follow up with: 


TIFFANIE MTZ [Other] - 7 Days


JOANA SANTOS MD [Staff Physician] - 7 Days


FELICIANO GILBERT MD [Staff Physician] - 7 Days


REESE AQUINO MD [Staff Physician] - 7 Days


ANETA EL MD [Staff Physician] - 7 Days


Prescriptions: 


Ferrous Sulfate [Feosol 325 MG tab] 325 mg PO BID #60 tablet


Sennosides/Docusate Sodium [Senna Plus Tablet] 1 each PO DAILY #30 tablet


Ascorbic Acid [Vitamin C] 1,000 mg PO DAILY #30 tablet

## 2021-10-11 ENCOUNTER — HOSPITAL ENCOUNTER (EMERGENCY)
Dept: HOSPITAL 5 - ED | Age: 31
LOS: 1 days | Discharge: HOME | End: 2021-10-12
Payer: COMMERCIAL

## 2021-10-11 VITALS — DIASTOLIC BLOOD PRESSURE: 77 MMHG | SYSTOLIC BLOOD PRESSURE: 121 MMHG

## 2021-10-11 DIAGNOSIS — R10.32: Primary | ICD-10-CM

## 2021-10-11 DIAGNOSIS — D64.9: ICD-10-CM

## 2021-10-11 LAB
ALBUMIN SERPL-MCNC: 4.1 G/DL (ref 3.9–5)
ALT SERPL-CCNC: 7 UNITS/L (ref 7–56)
BASOPHILS # (AUTO): 0 K/MM3 (ref 0–0.1)
BASOPHILS NFR BLD AUTO: 0.3 % (ref 0–1.8)
BENZODIAZEPINES SCREEN,URINE: (no result)
BILIRUB UR QL STRIP: (no result)
BLOOD UR QL VISUAL: (no result)
BUN SERPL-MCNC: 8 MG/DL (ref 7–17)
BUN/CREAT SERPL: 13 %
CALCIUM SERPL-MCNC: 9.1 MG/DL (ref 8.4–10.2)
EOSINOPHIL # BLD AUTO: 0.1 K/MM3 (ref 0–0.4)
EOSINOPHIL NFR BLD AUTO: 1.1 % (ref 0–4.3)
HCT VFR BLD CALC: 25.7 % (ref 30.3–42.9)
HEMOLYSIS INDEX: 0
HGB BLD-MCNC: 8.2 GM/DL (ref 10.1–14.3)
LYMPHOCYTES # BLD AUTO: 2.1 K/MM3 (ref 1.2–5.4)
LYMPHOCYTES NFR BLD AUTO: 35.3 % (ref 13.4–35)
MCHC RBC AUTO-ENTMCNC: 32 % (ref 30–34)
MCV RBC AUTO: 71 FL (ref 79–97)
METHADONE SCREEN,URINE: (no result)
MONOCYTES # (AUTO): 0.3 K/MM3 (ref 0–0.8)
MONOCYTES % (AUTO): 4.7 % (ref 0–7.3)
MUCOUS THREADS #/AREA URNS HPF: (no result) /HPF
OPIATE SCREEN,URINE: (no result)
PH UR STRIP: 8 [PH] (ref 5–7)
PLATELET # BLD: 374 K/MM3 (ref 140–440)
RBC # BLD AUTO: 3.62 M/MM3 (ref 3.65–5.03)
RBC #/AREA URNS HPF: 2 /HPF (ref 0–6)
UROBILINOGEN UR-MCNC: < 2 MG/DL (ref ?–2)
WBC #/AREA URNS HPF: 1 /HPF (ref 0–6)

## 2021-10-11 PROCEDURE — 96361 HYDRATE IV INFUSION ADD-ON: CPT

## 2021-10-11 PROCEDURE — 81001 URINALYSIS AUTO W/SCOPE: CPT

## 2021-10-11 PROCEDURE — 80053 COMPREHEN METABOLIC PANEL: CPT

## 2021-10-11 PROCEDURE — 84703 CHORIONIC GONADOTROPIN ASSAY: CPT

## 2021-10-11 PROCEDURE — 83690 ASSAY OF LIPASE: CPT

## 2021-10-11 PROCEDURE — 74177 CT ABD & PELVIS W/CONTRAST: CPT

## 2021-10-11 PROCEDURE — 85025 COMPLETE CBC W/AUTO DIFF WBC: CPT

## 2021-10-11 PROCEDURE — 80307 DRUG TEST PRSMV CHEM ANLYZR: CPT

## 2021-10-11 PROCEDURE — 99285 EMERGENCY DEPT VISIT HI MDM: CPT

## 2021-10-11 PROCEDURE — 36415 COLL VENOUS BLD VENIPUNCTURE: CPT

## 2021-10-11 PROCEDURE — 96374 THER/PROPH/DIAG INJ IV PUSH: CPT

## 2021-10-11 PROCEDURE — 96375 TX/PRO/DX INJ NEW DRUG ADDON: CPT

## 2021-10-11 NOTE — EMERGENCY DEPARTMENT REPORT
ED General Adult HPI





- General


Chief complaint: Abdominal Pain


Stated complaint: NAUSEA, VOMITING


PUI?: No


Time Seen by Provider: 10/11/21 22:56


Source: patient, EMS ( EMS documentation not available at time of chart dict

ation ), RN notes reviewed, old records reviewed


Mode of arrival: Ambulatory


Limitations: No Limitations





- History of Present Illness


Initial comments: 





The patient was evaluated in the emergency department for symptoms described in 

the history of present illness.  He/she was evaluated in the context of the [facility] COVID-19 pandemic, which necessitated consideration that the patient might

be at risk for infection with the virus that causes COVID-19.  Institutional 

protocols and algorithms that pertain to the evaluation of patients at risk for 

COVID-19 are in a state of rapid change based on information released by 

regulatory bodies including the CDC and federal and state organizations.  These 

policies and algorithms were followed during the patient's care in the emergency

department.  Please note that these policies, procedures and recommendations 

changed on a rapid basis.








During the entire history and physical examination, I am chaperoned by Karlee Williamson





The patient is a 31-year-old female.  Her past medical history includes body 

mass index of 41.6, and chronic iron deficiency anemia, with constipation, 

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





The patient presents to the ER today with a complaint of nontraumatic left flank

and left lower quadrant pain.  It is present for 2 weeks.  It is associate with 

nausea and vomiting.  The patient denies headache, neck pain, chest pain.  The 

patient does not consume marijuana.  The patient states her pain increases with 

palpation, range of motion, and decreases with rest and hydromorphone.





She denies vaginal discharge, pelvic pain, dysuria, and STI history.





Her symptoms are much improved in the emergency room with hydromorphone, and 

antiemetics.


-: Gradual, week(s)


Location: abdomen (Left flank, and left lower quadrant)


Radiation: other (Left flank to left lower quadrant)


Severity scale (0 -10): 8


Quality: aching


Consistency: intermittent


Improves with: medication, rest


Worsens with: movement





- Related Data


                                  Previous Rx's











 Medication  Instructions  Recorded  Last Taken  Type


 


Butalb/Acetamin/Caff -40 1 tab PO Q8HR PRN #10 tablet 19 Unknown Rx





[Fioricet -40]    


 


Menthol/Camphor [Tiger Balm 1 applicatio TP QID PRN #1 tube 19 Unknown Rx





Ointment]    


 


Ascorbic Acid [Vitamin C] 1,000 mg PO DAILY #30 tablet 21 Unknown Rx


 


Acetaminophen [Non-Aspirin Extra 500 mg PO Q6HR PRN #30 tablet 10/12/21 Unknown 

Rx





Strength]    


 


Ferrous Sulfate [Feosol 325 MG tab] 325 mg PO BID #60 tablet 10/12/21 Unknown Rx


 


Ibuprofen [Motrin] 600 mg PO Q8H PRN #30 tablet 10/12/21 Unknown Rx


 


Ondansetron [Zofran Odt] 4 mg PO Q8HR PRN #20 tab.rapdis 10/12/21 Unknown Rx


 


Promethazine [Phenergan SUPPOS] 50 mg AK Q6H PRN #20 supp.rect 10/12/21 Unknown 

Rx


 


Sennosides/Docusate Sodium [Senna 1 each PO DAILY #30 tablet 10/12/21 Unknown Rx





Plus Tablet]    











                                    Allergies











Allergy/AdvReac Type Severity Reaction Status Date / Time


 


No Known Allergies Allergy   Verified 19 18:04














ED Review of Systems


ROS: 


Stated complaint: NAUSEA, VOMITING


Other details as noted in HPI





Constitutional: denies: fever, malaise


Eyes: denies: eye discharge


ENT: denies: epistaxis


Respiratory: denies: cough


Cardiovascular: denies: chest pain


Gastrointestinal: abdominal pain, nausea.  denies: hematemesis, melena, 

hematochezia


Genitourinary: denies: dysuria


Musculoskeletal: back pain


Neurological: weakness


Psychiatric: anxiety


Hematological/Lymphatic: denies: easy bleeding





ED Past Medical Hx





- Past Medical History


Hx Headaches / Migraines: Yes


Additional medical history: ANEMIA.  BLOOD TRANSFUSION





- Surgical History


Additional Surgical History: ,tubal





- Social History


Smoking Status: Never Smoker


Substance Use Type: None





- Medications


Home Medications: 


                                Home Medications











 Medication  Instructions  Recorded  Confirmed  Last Taken  Type


 


Butalb/Acetamin/Caff -40 1 tab PO Q8HR PRN #10 tablet 19  Unknown Rx





[Fioricet -40]     


 


Menthol/Camphor [Tiger Balm 1 applicatio TP QID PRN #1 tube 19  Unknown Rx





Ointment]     


 


Ascorbic Acid [Vitamin C] 1,000 mg PO DAILY #30 tablet 21  Unknown Rx


 


Acetaminophen [Non-Aspirin Extra 500 mg PO Q6HR PRN #30 tablet 10/12/21  Unknown

 Rx





Strength]     


 


Ferrous Sulfate [Feosol 325 MG tab] 325 mg PO BID #60 tablet 10/12/21  Unknown 

Rx


 


Ibuprofen [Motrin] 600 mg PO Q8H PRN #30 tablet 10/12/21  Unknown Rx


 


Ondansetron [Zofran Odt] 4 mg PO Q8HR PRN #20 tab.rapdis 10/12/21  Unknown Rx


 


Promethazine [Phenergan SUPPOS] 50 mg AK Q6H PRN #20 supp.rect 10/12/21  Unknown

Rx


 


Sennosides/Docusate Sodium [Senna 1 each PO DAILY #30 tablet 10/12/21  Unknown 

Rx





Plus Tablet]     














ED Physical Exam





- General


Limitations: No Limitations


General appearance: alert, anxious, obese





- Head


Head exam: Present: atraumatic, normocephalic





- Eye


Eye exam: Present: normal appearance, EOMI.  Absent: conjunctival injection, 

nystagmus





- ENT


ENT exam: Present: normal exam, normal orophraynx, mucous membranes moist, 

normal external ear exam





- Neck


Neck exam: Present: normal inspection, full ROM.  Absent: tenderness, 

meningismus





- Respiratory


Respiratory exam: Present: normal lung sounds bilaterally.  Absent: respiratory 

distress





- Cardiovascular


Cardiovascular Exam: Present: regular rate, normal rhythm, normal heart sounds. 

Absent: bradycardia, tachycardia, irregular rhythm, systolic murmur, diastolic 

murmur, rubs, gallop





- GI/Abdominal


GI/Abdominal exam: Present: soft, tenderness, other (There is left flank 

tenderness.  There is left lower quadrant tenderness to deep palpation.).  

Absent: distended, guarding, rebound, rigid, pulsatile mass





- 


External exam: Present: normal external exam.  Absent: erythema, swelling, 

lesions, lacerations, ecchymosis, bleeding


Speculum exam: Present: normal speculum exam.  Absent: cervical discharge, 

vaginal bleeding, foreign body


Bi-manual exam: Present: normal bi-manual exam, other (Chaperoned Marion Williamson).  Absent: cervical motion tendernes, adnexal tenderness, adnexal mass, 

uterine enlargement, uterine tenderness





- Extremities Exam


Extremities exam: Present: normal inspection, full ROM, other (2+ pulses noted 

in the bilateral upper and lower extremities.  There is no palpable cord.   

negative Homans sign.  Muscular compartments are soft.  The pelvis is stable.). 

Absent: pedal edema, calf tenderness





- Back Exam


Back exam: Present: normal inspection, full ROM.  Absent: tenderness, CVA 

tenderness (R), CVA tenderness (L), paraspinal tenderness, vertebral tenderness





- Neurological Exam


Neurological exam: Present: alert, oriented X3, normal gait, other (No facial d

yris.  Tongue midline.  Extraocular movements intact bilaterally.  Facial 

sensation intact to light touch in V1, V2, V3 distribution bilaterally.  5 and a

5 strength in 4 extremities.  Sensation intact to light touch in 4 

extremities.).  Absent: motor sensory deficit





- Psychiatric


Psychiatric exam: Present: normal affect, normal mood





- Skin


Skin exam: Present: warm, dry, intact, normal color.  Absent: rash





ED Course


                                   Vital Signs











  10/11/21





  22:01


 


Temperature 99 F


 


Pulse Rate 81


 


Respiratory 18





Rate 


 


Blood Pressure 121/77





[Left] 


 


O2 Sat by Pulse 98





Oximetry 














- Reevaluation(s)


Reevaluation #1: 





10/12/21 00:33


Differential diagnosis, including but not limited to: Obstruction, volvulus, 

renal colic, cyclic vomiting syndrome, cannabinoid hyperemesis syndrome, urinary

 tract infection, pyelonephritis, ovarian cyst, constipation, functional nausea 

and vomiting





Assessment and plan: 31-year-old female, with a benign and unremarkable 

gynecologic examination, no urinary symptoms, presenting with left lower 

quadrant pain, tenderness, nausea and vomiting.





She felt improved after hydromorphone, and Reglan.  Laboratory studies so far 

unremarkable.  Renal colic/urinary tract infection less likely.  CT scan abdomen

 pelvis obtained.  Reassess after data points.


10/12/21 00:34


Anemia is chronic


10/12/21 01:19


Patient reexamined multiple times.  On multiple repeat assessments, she is 

sitting comfortably on a chair, in no acute distress.  CT scan abdomen pelvis 

negative for emergent surgical findings.





Possible left-sided hydrosalpinx is reviewed and appreciated.  She has no 

gynecologic tenderness on my exam.  There is no discharge.  I do not suspect 

STI.  This may be a chronic hydrosalpinx.





I discussed this with the patient.





She articulated understanding.  Her symptoms are significantly improved.  No 

active vomiting.  She follows up with outpatient gynecologist, Dr. Ramos.  

Discussed laboratory studies, CT scan findings and significance.  Patient 

articulated understanding.  She endorses readiness for discharge.  Return 

precautions are reviewed.





ED Medical Decision Making





- Lab Data


Result diagrams: 


                                 10/11/21 22:56





                                 10/11/21 22:56








                                   Vital Signs











  10/11/21





  22:01


 


Temperature 99 F


 


Pulse Rate 81


 


Respiratory 18





Rate 


 


Blood Pressure 121/77





[Left] 


 


O2 Sat by Pulse 98





Oximetry 











                                   Lab Results











  10/11/21 10/11/21 10/11/21 Range/Units





  22:56 22:56 22:56 


 


WBC  6.0    (4.5-11.0)  K/mm3


 


RBC  3.62 L    (3.65-5.03)  M/mm3


 


Hgb  8.2 L    (10.1-14.3)  gm/dl


 


Hct  25.7 L    (30.3-42.9)  %


 


MCV  71 L    (79-97)  fl


 


MCH  23 L    (28-32)  pg


 


MCHC  32    (30-34)  %


 


RDW  23.7 H    (13.2-15.2)  %


 


Plt Count  374    (140-440)  K/mm3


 


Lymph % (Auto)  35.3 H    (13.4-35.0)  %


 


Mono % (Auto)  4.7    (0.0-7.3)  %


 


Eos % (Auto)  1.1    (0.0-4.3)  %


 


Baso % (Auto)  0.3    (0.0-1.8)  %


 


Lymph # (Auto)  2.1    (1.2-5.4)  K/mm3


 


Mono # (Auto)  0.3    (0.0-0.8)  K/mm3


 


Eos # (Auto)  0.1    (0.0-0.4)  K/mm3


 


Baso # (Auto)  0.0    (0.0-0.1)  K/mm3


 


Seg Neutrophils %  58.6    (40.0-70.0)  %


 


Seg Neutrophils #  3.5    (1.8-7.7)  K/mm3


 


Sodium   138   (137-145)  mmol/L


 


Potassium   3.9   (3.6-5.0)  mmol/L


 


Chloride   103.1   ()  mmol/L


 


Carbon Dioxide   25   (22-30)  mmol/L


 


Anion Gap   14   mmol/L


 


BUN   8   (7-17)  mg/dL


 


Creatinine   0.6   (0.6-1.2)  mg/dL


 


Estimated GFR   > 60   ml/min


 


BUN/Creatinine Ratio   13   %


 


Glucose   105 H   ()  mg/dL


 


Calcium   9.1   (8.4-10.2)  mg/dL


 


Total Bilirubin   0.20   (0.1-1.2)  mg/dL


 


AST   14   (5-40)  units/L


 


ALT   7   (7-56)  units/L


 


Alkaline Phosphatase   58   ()  units/L


 


Total Protein   7.8   (6.3-8.2)  g/dL


 


Albumin   4.1   (3.9-5)  g/dL


 


Albumin/Globulin Ratio   1.1   %


 


Lipase   23   (13-60)  units/L


 


HCG, Qual    Negative  (Negative)  


 


Urine Color     (Yellow)  


 


Urine Turbidity     (Clear)  


 


Urine pH     (5.0-7.0)  


 


Ur Specific Gravity     (1.003-1.030)  


 


Urine Protein     (Negative)  mg/dL


 


Urine Glucose (UA)     (Negative)  mg/dL


 


Urine Ketones     (Negative)  mg/dL


 


Urine Blood     (Negative)  


 


Urine Nitrite     (Negative)  


 


Urine Bilirubin     (Negative)  


 


Urine Urobilinogen     (<2.0)  mg/dL


 


Ur Leukocyte Esterase     (Negative)  


 


Urine WBC (Auto)     (0.0-6.0)  /HPF


 


Urine RBC (Auto)     (0.0-6.0)  /HPF


 


U Epithel Cells (Auto)     (0-13.0)  /HPF


 


Urine Mucus     /HPF


 


Urine Opiates Screen     


 


Urine Methadone Screen     


 


Ur Barbiturates Screen     


 


Ur Phencyclidine Scrn     


 


Ur Amphetamines Screen     


 


U Benzodiazepines Scrn     


 


Urine Cocaine Screen     


 


U Marijuana (THC) Screen     


 


Drugs of Abuse Note     














  10/11/21 10/11/21 Range/Units





  23:20 23:20 


 


WBC    (4.5-11.0)  K/mm3


 


RBC    (3.65-5.03)  M/mm3


 


Hgb    (10.1-14.3)  gm/dl


 


Hct    (30.3-42.9)  %


 


MCV    (79-97)  fl


 


MCH    (28-32)  pg


 


MCHC    (30-34)  %


 


RDW    (13.2-15.2)  %


 


Plt Count    (140-440)  K/mm3


 


Lymph % (Auto)    (13.4-35.0)  %


 


Mono % (Auto)    (0.0-7.3)  %


 


Eos % (Auto)    (0.0-4.3)  %


 


Baso % (Auto)    (0.0-1.8)  %


 


Lymph # (Auto)    (1.2-5.4)  K/mm3


 


Mono # (Auto)    (0.0-0.8)  K/mm3


 


Eos # (Auto)    (0.0-0.4)  K/mm3


 


Baso # (Auto)    (0.0-0.1)  K/mm3


 


Seg Neutrophils %    (40.0-70.0)  %


 


Seg Neutrophils #    (1.8-7.7)  K/mm3


 


Sodium    (137-145)  mmol/L


 


Potassium    (3.6-5.0)  mmol/L


 


Chloride    ()  mmol/L


 


Carbon Dioxide    (22-30)  mmol/L


 


Anion Gap    mmol/L


 


BUN    (7-17)  mg/dL


 


Creatinine    (0.6-1.2)  mg/dL


 


Estimated GFR    ml/min


 


BUN/Creatinine Ratio    %


 


Glucose    ()  mg/dL


 


Calcium    (8.4-10.2)  mg/dL


 


Total Bilirubin    (0.1-1.2)  mg/dL


 


AST    (5-40)  units/L


 


ALT    (7-56)  units/L


 


Alkaline Phosphatase    ()  units/L


 


Total Protein    (6.3-8.2)  g/dL


 


Albumin    (3.9-5)  g/dL


 


Albumin/Globulin Ratio    %


 


Lipase    (13-60)  units/L


 


HCG, Qual    (Negative)  


 


Urine Color  Yellow   (Yellow)  


 


Urine Turbidity  Clear   (Clear)  


 


Urine pH  8.0 H   (5.0-7.0)  


 


Ur Specific Gravity  1.020   (1.003-1.030)  


 


Urine Protein  30 mg/dl   (Negative)  mg/dL


 


Urine Glucose (UA)  Neg   (Negative)  mg/dL


 


Urine Ketones  Neg   (Negative)  mg/dL


 


Urine Blood  Neg   (Negative)  


 


Urine Nitrite  Neg   (Negative)  


 


Urine Bilirubin  Neg   (Negative)  


 


Urine Urobilinogen  < 2.0   (<2.0)  mg/dL


 


Ur Leukocyte Esterase  Neg   (Negative)  


 


Urine WBC (Auto)  1.0   (0.0-6.0)  /HPF


 


Urine RBC (Auto)  2.0   (0.0-6.0)  /HPF


 


U Epithel Cells (Auto)  3.0   (0-13.0)  /HPF


 


Urine Mucus  Few   /HPF


 


Urine Opiates Screen   Presumptive negative  


 


Urine Methadone Screen   Presumptive negative  


 


Ur Barbiturates Screen   Presumptive negative  


 


Ur Phencyclidine Scrn   Presumptive negative  


 


Ur Amphetamines Screen   Presumptive negative  


 


U Benzodiazepines Scrn   Presumptive negative  


 


Urine Cocaine Screen   Presumptive negative  


 


U Marijuana (THC) Screen   Presumptive negative  


 


Drugs of Abuse Note   Disclamer  














- Radiology Data


Radiology results: report reviewed, image reviewed





CT ABDOMEN AND PELVIS WITH CONTRAST  INDICATION: L.L.Q. abd / LEFT flank pain 

with nausea and vomiting.  TECHNIQUE: Axial CT images were obtained through the 

abdomen and pelvis after 100 cc IV contrast. All CT scans at this location are 

performed using CT dose reduction for ALARA by means of automated exposure 

control.  COMPARISON: None available.  FINDINGS: LOWER CHEST: No significant 

abnormality. LIVER: No significant abnormality. GALLBLADDER: No significant 

abnormality. BILE DUCTS: No significant abnormality. PANCREAS: No significant 

abnormality. SPLEEN: No significant abnormality. ADRENALS: No significant 

abnormality. RIGHT KIDNEY and URETER: No significant abnormality. LEFT KIDNEY 

and URETER: No significant abnormality.  STOMACH and SMALL BOWEL: No significant

 abnormality. COLON: No significant abnormality. APPENDIX: No significant 

abnormality. PERITONEUM: No free fluid. No free air. No fluid collection. LYMPH 

NODES: No significant adenopathy. AORTA and ARTERIES: No significant 

abnormality. IVC and VEINS: No significant abnormality.  URINARY BLADDER: No 

significant abnormality. REPRODUCTIVE ORGANS: Tubular cystic structure along the

 left border of uterus likely represents hydrosalpinx measuring 2 cm in 

transverse diameter. ADDITIONAL FINDINGS: None.  SKELETAL SYSTEM: No significant

 abnormality.  IMPRESSION: 1. Probable left hydrosalpinx. 2. Otherwise negative 

CT abdomen and pelvis  Signer Name: Chris Morales MD Signed: 10/11/2021 11:55

 PM Workstation Name: [id number]


Critical care attestation.: 


If time is entered above; I have spent that time in minutes in the direct care 

of this critically ill patient, excluding procedure time.








ED Disposition


Clinical Impression: 


 Left flank pain, Anemia





Disposition: 01 HOME / SELF CARE / HOMELESS


Is pt being admited?: No


Does the pt Need Aspirin: No


Condition: Good


Instructions:  Abdominal Pain (ED), Flank Pain, Adult, Easy-to-Read


Additional Instructions: 


Please take the iron sulfate medication twice daily as directed.  Use the 

sennosides as directed to assist with constipation.





Take the prescribed pain medications as needed and directed.  Use Zofran as 

needed for nausea and vomiting, and Phenergan suppository as needed for 

intractable nausea and vomiting.





Please follow-up with an outpatient OB/GYN doctor within the next 2 weeks.  CT 

scan of the abdomen pelvis suggested left hydrosalpinx (swollen fallopian tube.)





Based off of the history and physical, as well as imaging findings, we do not 

believe that this is infected.  This should be followed up by a gynecologist.





Please follow-up with an outpatient primary care doctor for chronic anemia.





Please return to the emergency room right away with new pain, worsened pain, 

migration of pain, projectile vomiting, change in mental status, confusion, 

inability tolerate liquid feeds, new, worsened or different symptoms not present

 on the initial emergency room evaluation.


Referrals: 


[facility] [Provider Group] - 3-5 Days


[facility] [Provider Group] - 3-5 Days


[facility] [Provider Group] - 3-5 Days


Forms:  Work/School Release Form(ED)

## 2021-10-12 NOTE — CAT SCAN REPORT
CT ABDOMEN AND PELVIS WITH CONTRAST



INDICATION:

L.L.Q. abd / LEFT flank pain with nausea and vomiting.



TECHNIQUE:

Axial CT images were obtained through the abdomen and pelvis after 100 cc IV contrast.  All CT scans 
at this location are performed using CT dose reduction for ALARA by means of automated exposure contr
ol. 



COMPARISON:

None available.



FINDINGS:

LOWER CHEST: No significant abnormality.

LIVER: No significant abnormality.

GALLBLADDER: No significant abnormality.  

BILE DUCTS: No significant abnormality.

PANCREAS: No significant abnormality.

SPLEEN: No significant abnormality.

ADRENALS: No significant abnormality.

RIGHT KIDNEY and URETER: No significant abnormality.

LEFT KIDNEY and URETER: No significant abnormality.



STOMACH and SMALL BOWEL: No significant abnormality. 

COLON: No significant abnormality. 

APPENDIX: No significant abnormality.  

PERITONEUM: No free fluid. No free air. No fluid collection.

LYMPH NODES: No significant adenopathy.

AORTA and ARTERIES: No significant abnormality. 

IVC and VEINS: No significant abnormality.



URINARY BLADDER: No significant abnormality.

REPRODUCTIVE ORGANS: Tubular cystic structure along the left border of uterus likely represents hydro
salpinx measuring 2 cm in transverse diameter. ADDITIONAL FINDINGS: None.



SKELETAL SYSTEM: No significant abnormality.



IMPRESSION:

1. Probable left hydrosalpinx.

2. Otherwise negative CT abdomen and pelvis



Signer Name: Chris Morales MD 

Signed: 10/12/2021 12:55 AM

Workstation Name: [email or web address]

## 2022-06-24 ENCOUNTER — HOSPITAL ENCOUNTER (EMERGENCY)
Dept: HOSPITAL 5 - ED | Age: 32
LOS: 1 days | Discharge: LEFT BEFORE DISCHARGE | End: 2022-06-25
Payer: COMMERCIAL
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
## 2022-06-24 DIAGNOSIS — Z53.21: ICD-10-CM

## 2022-06-24 DIAGNOSIS — T80.92XA: Primary | ICD-10-CM

## 2022-06-24 LAB
%HYPO/RBC NFR BLD AUTO: (no result) %
ALBUMIN SERPL-MCNC: 4.5 G/DL (ref 3.9–5)
ALT SERPL-CCNC: 6 UNITS/L (ref 7–56)
ANISOCYTOSIS BLD QL SMEAR: (no result)
BAND NEUTROPHILS # (MANUAL): 0 K/MM3
BUN SERPL-MCNC: 6 MG/DL (ref 7–17)
BUN/CREAT SERPL: 9 %
CALCIUM SERPL-MCNC: 9.5 MG/DL (ref 8.4–10.2)
HCT VFR BLD CALC: 24.2 % (ref 30.3–42.9)
HEMOLYSIS INDEX: 0
HGB BLD-MCNC: 6.6 GM/DL (ref 10.1–14.3)
MCHC RBC AUTO-ENTMCNC: 27 % (ref 30–34)
MCV RBC AUTO: 64 FL (ref 79–97)
MYELOCYTES # (MANUAL): 0 K/MM3
PLATELET # BLD: 165 K/MM3 (ref 140–440)
POIKILOCYTOSIS BLD QL SMEAR: (no result)
PROMYELOCYTES # (MANUAL): 0 K/MM3
RBC # BLD AUTO: 3.78 M/MM3 (ref 3.65–5.03)
TOTAL CELLS COUNTED BLD: 100

## 2022-06-24 PROCEDURE — 85007 BL SMEAR W/DIFF WBC COUNT: CPT

## 2022-06-24 PROCEDURE — 36415 COLL VENOUS BLD VENIPUNCTURE: CPT

## 2022-06-24 PROCEDURE — 86850 RBC ANTIBODY SCREEN: CPT

## 2022-06-24 PROCEDURE — 86901 BLOOD TYPING SEROLOGIC RH(D): CPT

## 2022-06-24 PROCEDURE — 85025 COMPLETE CBC W/AUTO DIFF WBC: CPT

## 2022-06-24 PROCEDURE — 86900 BLOOD TYPING SEROLOGIC ABO: CPT

## 2022-06-24 PROCEDURE — 80053 COMPREHEN METABOLIC PANEL: CPT

## 2025-08-21 ENCOUNTER — OFFICE VISIT (OUTPATIENT)
Dept: URBAN - METROPOLITAN AREA CLINIC 118 | Facility: CLINIC | Age: 35
End: 2025-08-21